# Patient Record
Sex: MALE | Race: BLACK OR AFRICAN AMERICAN | NOT HISPANIC OR LATINO | URBAN - METROPOLITAN AREA
[De-identification: names, ages, dates, MRNs, and addresses within clinical notes are randomized per-mention and may not be internally consistent; named-entity substitution may affect disease eponyms.]

---

## 2023-02-16 ENCOUNTER — TELEPHONE (OUTPATIENT)
Dept: GASTROENTEROLOGY | Facility: CLINIC | Age: 68
End: 2023-02-16

## 2023-02-16 NOTE — TELEPHONE ENCOUNTER
Left message for patient to call office to reschedule 2/20 appointment with Josefa Valerio, she will not be in this day

## 2023-02-17 NOTE — TELEPHONE ENCOUNTER
Left message for patient that appointment for 2/20 is being cancelled because provider will not be in and please call to reschedule appointment

## 2023-05-08 ENCOUNTER — HOSPITAL ENCOUNTER (OUTPATIENT)
Dept: ULTRASOUND IMAGING | Facility: HOSPITAL | Age: 68
Discharge: HOME | End: 2023-05-08

## 2023-05-08 DIAGNOSIS — B18.1 CHRONIC VIRAL HEPATITIS B WITHOUT DELTA AGENT AND WITHOUT COMA (HCC): ICD-10-CM

## 2023-05-11 ENCOUNTER — TELEPHONE (OUTPATIENT)
Dept: GASTROENTEROLOGY | Facility: CLINIC | Age: 68
End: 2023-05-11

## 2023-05-11 NOTE — TELEPHONE ENCOUNTER
----- Message from Jae Garsia PA-C sent at 5/11/2023 10:27 AM EDT -----  Please let patient know that he does have a fatty liver, however does not have any scarring in his liver which is great news  He has cleared hepatitis B on his own, but we should be performing ultrasounds every 6 months to make sure that the patient does not develop any signs of liver cancer since he is likely had hepatitis B for many years  We can also go over this again during our follow-up  Thank you

## 2023-05-11 NOTE — TELEPHONE ENCOUNTER
Called and spoke with patient and went over to the ultrasound results with him  He stated that he will like to discuss this with darlene on his next visit  Pt did voiced understanding

## 2023-05-24 ENCOUNTER — OFFICE VISIT (OUTPATIENT)
Dept: GASTROENTEROLOGY | Facility: CLINIC | Age: 68
End: 2023-05-24

## 2023-05-24 VITALS
WEIGHT: 222.6 LBS | HEART RATE: 85 BPM | HEIGHT: 73 IN | SYSTOLIC BLOOD PRESSURE: 142 MMHG | BODY MASS INDEX: 29.5 KG/M2 | OXYGEN SATURATION: 99 % | DIASTOLIC BLOOD PRESSURE: 78 MMHG

## 2023-05-24 DIAGNOSIS — B18.1 CHRONIC HEPATITIS B (HCC): ICD-10-CM

## 2023-05-24 DIAGNOSIS — R73.01 ELEVATED FASTING BLOOD SUGAR: Primary | ICD-10-CM

## 2023-05-24 NOTE — PROGRESS NOTES
[facility]  Vick Lares 79 y o  male MRN: [id number]       CC: Follow-up for chronic hepatitis B, cleared by immune system    HPI: Harmeet Castillo is a 63-year-old male who presents to the office for history of chronic hepatitis B and fatty liver disease  Patient was first seen by me in April by referral of his PCP, and reports that he had seen Dr Cora Nobles before however it has been several years  Patient is unsure how he acquired hepatitis B, but could have been from birth  He has no complaints at this time  He had elastography and ultrasound done  Elastography revealing no fibrosis, but did have a steatosis score of 3  No suspicious liver lesions on ultrasound  His hepatitis B quantitative level is nondetected  Patient follows with Dr Nely Jang for colonoscopy  Review of Systems:    CONSTITUTIONAL: Denies any fever, chills, or rigors  Good appetite, and no recent weight loss  HEENT: No earache or tinnitus  Denies hearing loss or visual disturbances  CARDIOVASCULAR: No chest pain or palpitations  RESPIRATORY: Denies any cough, hemoptysis, shortness of breath or dyspnea on exertion  GASTROINTESTINAL: As noted in the History of Present Illness  GENITOURINARY: No problems with urination  Denies any hematuria or dysuria  NEUROLOGIC: No dizziness or vertigo, denies headaches  MUSCULOSKELETAL: Denies any muscle or joint pain  SKIN: Denies skin rashes or itching  ENDOCRINE: Denies excessive thirst  Denies intolerance to heat or cold  PSYCHOSOCIAL: Denies depression or anxiety  Denies any recent memory loss  Current Outpatient Medications   Medication Sig Dispense Refill   • amLODIPine (NORVASC) 5 mg tablet Take 5 mg by mouth daily       No current facility-administered medications for this visit  History reviewed  No pertinent past medical history  History reviewed  No pertinent surgical history    Social History     Socioeconomic History   • Marital status: "/Civil [employer]     Spouse name: None   • Number of children: None   • Years of education: None   • Highest education level: None   Occupational History   • None   Tobacco Use   • Smoking status: Never   • Smokeless tobacco: Never   Vaping Use   • Vaping Use: Never used   Substance and Sexual Activity   • Alcohol use: Never   • Drug use: Never   • Sexual activity: None   Other Topics Concern   • None   Social History Narrative   • None     Social Determinants of Health     Financial Resource Strain: Not on file   Food Insecurity: Not on file   Transportation Needs: Not on file   Physical Activity: Not on file   Stress: Not on file   Social Connections: Not on file   Intimate Partner Violence: Not on file   Housing Stability: Not on file     Family History   Problem Relation Age of Onset   • No Known Problems Mother    • No Known Problems Father    • No Known Problems Sister    • No Known Problems Brother    • No Known Problems Maternal Grandmother    • No Known Problems Maternal Grandfather    • No Known Problems Paternal Grandmother    • No Known Problems Paternal Grandfather    • No Known Problems Maternal Aunt    • No Known Problems Maternal Uncle    • No Known Problems Paternal Aunt    • No Known Problems Paternal Uncle    • No Known Problems Cousin             PHYSICAL EXAM:    Vitals:    05/24/23 1055   BP: 142/78   BP Location: Right arm   Patient Position: Sitting   Cuff Size: Standard   Pulse: 85   SpO2: 99%   Weight: 101 kg (222 lb 9 6 oz)   Height: 6' 1\" (1 854 m)     General Appearance:   Alert and oriented x 3   Cooperative, and in no respiratory distress   HEENT:   Normocephalic, atraumatic, anicteric      Neck:  Supple, symmetrical, trachea midline   Lungs:   Clear to auscultation bilaterally    Heart[de-identified]   Regular rate and rhythm   Abdomen:   Soft, non-tender, non-distended; normal bowel sounds; no masses, no organomegaly    Genitalia:   Deferred    Rectal:   Deferred    Extremities:  No cyanosis, " "clubbing or edema    Pulses:  2+ and symmetric all extremities    Skin:  Skin color, texture, turgor normal, no rashes or lesions    Lymph nodes:  No palpable cervical or supraclavicular lymphadenopathy        Lab Results:             Invalid input(s): \"LABALBU\"            Imaging Studies: I have personally reviewed pertinent imaging studies  US right upper quadrant    Result Date: 5/11/2023  Impression: Hepatic steatosis  5 mm gallbladder polyp  According to current consensus recommendations (SRU 2022; 000:1-12), for polyps of this size ( <=  6 mm) which have a low risk morphology (pedunculated with thick/wide stalk, or sessile), no follow-up is recommended  Reference: Management of Incidentally Detected Gallbladder Polyps: Society of Radiologists in Ultrasound Consensus Conference Recommendations  Radiology 2022; 000:1-12  Workstation performed: [id number]     US elastography    Result Date: 5/11/2023  Impression: Metavir score: F0 - F1, Absent or Mild Fibrosis According to the updated SRU consensus statement, a liver stiffness of 6 96 kPa, which in the absence of other known clinical signs*, rules out compensated advanced chronic liver disease (cACLD)  If there are known clinical signs, may need further test for confirmation  https://pubs  rsna org/doi/10 1148/radiol  [id number] Liver steatosis grading: S3 ( > 67% steatosis) Workstation performed: [id number]       ASSESSMENT and PLAN:      1) History of chronic hepatitis B with loss of hepatitis B surface antigen; fatty liver disease, steatosis score of 3 - Fortunately, patient has cleared hepatitis B on his own  Because he has likely had hepatitis B for over 40 years, we will recommend [facility] [address]  screening every 6 months with imaging and AFP blood work  Patient and patient's wife voiced understanding  From a fatty liver standpoint, we did discuss risk of development of cirrhosis    I recommended that he lose weight in order to help prevent progression of liver " disease, and to potentially lower his steatosis score  - Recommended check of hemoglobin A1c  - Weight loss  - High-protein diet  - Recommend repeat hepatitis B vaccination  - Abdominal ultrasound and AFP every 6 months, will be due again in November      Follow up in December

## 2023-06-02 ENCOUNTER — APPOINTMENT (OUTPATIENT)
Dept: LAB | Facility: HOSPITAL | Age: 68
End: 2023-06-02
Payer: COMMERCIAL

## 2023-06-02 DIAGNOSIS — R73.01 ELEVATED FASTING BLOOD SUGAR: ICD-10-CM

## 2023-06-02 LAB
EST. AVERAGE GLUCOSE BLD GHB EST-MCNC: 128 MG/DL
HBA1C MFR BLD: 6.1 %

## 2023-06-02 PROCEDURE — 36415 COLL VENOUS BLD VENIPUNCTURE: CPT

## 2023-06-02 PROCEDURE — 83036 HEMOGLOBIN GLYCOSYLATED A1C: CPT

## 2023-06-05 ENCOUNTER — TELEPHONE (OUTPATIENT)
Dept: GASTROENTEROLOGY | Facility: CLINIC | Age: 68
End: 2023-06-05

## 2023-06-05 NOTE — TELEPHONE ENCOUNTER
----- Message from Alfreda Harada, PA-C sent at 6/4/2023 10:47 AM EDT -----  Please let patient know that he is prediabetic    Recommend follow-up with his PCP

## 2023-06-05 NOTE — TELEPHONE ENCOUNTER
Called and LMOM with blood work results as per Lissette Thompson blood work shows he is prediabetic  He will need to follow up with PCP  To call the office with any questions

## 2023-11-29 ENCOUNTER — TELEPHONE (OUTPATIENT)
Dept: GASTROENTEROLOGY | Facility: CLINIC | Age: 68
End: 2023-11-29

## 2024-02-19 ENCOUNTER — TELEPHONE (OUTPATIENT)
Dept: GASTROENTEROLOGY | Facility: CLINIC | Age: 69
End: 2024-02-19

## 2024-02-26 ENCOUNTER — TELEPHONE (OUTPATIENT)
Dept: GASTROENTEROLOGY | Facility: CLINIC | Age: 69
End: 2024-02-26